# Patient Record
Sex: FEMALE
[De-identification: names, ages, dates, MRNs, and addresses within clinical notes are randomized per-mention and may not be internally consistent; named-entity substitution may affect disease eponyms.]

---

## 2018-07-17 PROBLEM — Z00.00 ENCOUNTER FOR PREVENTIVE HEALTH EXAMINATION: Status: ACTIVE | Noted: 2018-07-17

## 2019-01-28 ENCOUNTER — APPOINTMENT (OUTPATIENT)
Dept: PAIN MANAGEMENT | Facility: CLINIC | Age: 79
End: 2019-01-28
Payer: SELF-PAY

## 2019-01-28 DIAGNOSIS — G43.709 CHRONIC MIGRAINE W/OUT AURA, NOT INTRACTABLE, W/OUT STATUS MIGRAINOSUS: ICD-10-CM

## 2019-01-28 DIAGNOSIS — R51 HEADACHE: ICD-10-CM

## 2019-01-28 PROCEDURE — 99204 OFFICE O/P NEW MOD 45 MIN: CPT

## 2019-01-28 RX ORDER — GALCANEZUMAB 120 MG/ML
120 INJECTION, SOLUTION SUBCUTANEOUS
Qty: 2 | Refills: 0 | Status: ACTIVE | COMMUNITY
Start: 2019-01-28 | End: 1900-01-01

## 2019-01-28 RX ORDER — GALCANEZUMAB 120 MG/ML
120 INJECTION, SOLUTION SUBCUTANEOUS
Qty: 2 | Refills: 3 | Status: ACTIVE | COMMUNITY
Start: 2019-01-28 | End: 1900-01-01

## 2019-01-30 PROBLEM — G43.709 CHRONIC MIGRAINE: Status: ACTIVE | Noted: 2019-01-30

## 2019-01-30 PROBLEM — R51 CHRONIC INTRACTABLE HEADACHE, UNSPECIFIED HEADACHE TYPE: Status: ACTIVE | Noted: 2019-01-28

## 2019-01-30 NOTE — END OF VISIT
[] : Resident [FreeTextEntry3] : Pt with long history of chronic migraine with limited response to prevention over time.  Will plan for trial of CGRP response antibody

## 2019-01-30 NOTE — HISTORY OF PRESENT ILLNESS
[FreeTextEntry1] : Pt is a 77 y/o [place] F w/ PMHx chronic migraine, carpal tunnel, hysterectomy at age 51, diverticulitis requiring colon resection, cataracts, unprovoked DVT recently s/p anticoagulation x6 mo w/ positive antiphospholipid anticoagulant, presenting for an initial visit for chronic migraine since age 31.  Initially started like a tension feeling in her cheek, which she attributed to dental pain, but did not bother her. After this, does not recall the type of her typical headaches. For the past 20 years, headaches mostly occur in the morning, in the middle of her head, sometimes centers around one eye, prevents her from standing up, takes Naratriptan and then is able to stand up. \par Severity: 10/10, constant, dull, after one dose of Naratriptan goes down to 0/10.  Usually only requires one dose, but about 2-3x/mo has to take two doses. Takes about 14-16 triptans/mo. \par Frequency: irregular, some weeks up to 3x/wk, others less, in one month around 14 headache days/mo. 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 \par \par Imaging: no records available, has had MRI and CT in past, but more than 5 years ago. All reportedly normal. \par \par Fam History: remote history in aunt \par \par Prior Treatments: \par has tried supplements to lower histamine, diet restrictions to decrease histamine. \par Flumarizine 1 tab x3 mo\par Topiramate 50 mg QD x12 mo \par Propanolol 40 mg QD x10 mo \par Amitriptiline 75 mg QD x9 mo \par Depakote unknown dose \par Prednisone 40 mg x7 days, 20 mg x7 days, 10 mg x7 days \par Botox x 4 applications \par \par Current Meds: \par Naratriptan 20 mg \par Eletriptan (less frequently)\par  mg \par Ranitidine 150 mg qd \par Rosuvastatin 5 mg \par Timolol eye drops \par Melatonin prn \par Clonazepam 0.5 prn insomnia \par \par

## 2019-01-30 NOTE — REVIEW OF SYSTEMS
[As Noted in HPI] : as noted in HPI [Migraine Headache] : migraine headaches [Sleep Disturbances] : sleep disturbances [Negative] : Constitutional [Feeling Poorly] : feeling poorly [Feeling Tired] : feeling tired [Confused or Disoriented] : no confusion [Memory Lapses or Loss] : no memory loss [Decr. Concentrating Ability] : no decrease in concentrating ability [Difficulty with Language] : no ~M difficulty with language [Changed Thought Patterns] : no change in thought patterns [Repeating Questions] : no repeated questioning about recent events [Facial Weakness] : no facial weakness [Arm Weakness] : no arm weakness [Hand Weakness] : no hand weakness [Leg Weakness] : no leg weakness [Poor Coordination] : good coordination [Difficulty Writing] : no difficulty writing [Difficulties in Speech] : no speech difficulties [Numbness] : no numbness [Tingling] : no tingling [Abnormal Sensation] : no abnormal sensation [Hypersensitivity] : no hypersensitivity [Seizures] : no convulsions [Dizziness] : no dizziness [Lightheadedness] : no lightheadedness [Vertigo] : no vertigo [Cluster Headache] : no cluster headache [Tension Headache] : no tension-type headache [Difficulty Walking] : no difficulty walking [Inability to Walk] : able to walk [Ataxia] : no ataxia [Frequent Falls] : not falling [Limping] : not limping [Suicidal] : not suicidal [Anxiety] : no anxiety [Depression] : no depression [Palpitations] : no palpitations [Shortness Of Breath] : no shortness of breath

## 2019-01-30 NOTE — DISCUSSION/SUMMARY
[FreeTextEntry1] : 77 y/o F w/ chronic intractable migraine unresponsive to multiple treatment regimens. Discussed treatment options and adverse risks. She is interested in trying one of the newer CGRP antagonists, after explanations of benefits and possible adverse risks. \par \par Plan: \par - Emgality 120 mg/mL x2 for first dose at the office \par

## 2019-01-30 NOTE — PHYSICAL EXAM
[General Appearance - Alert] : alert [General Appearance - In No Acute Distress] : in no acute distress [Oriented To Time, Place, And Person] : oriented to person, place, and time [Impaired Insight] : insight and judgment were intact [Affect] : the affect was normal [Mood] : the mood was normal [Person] : oriented to person [Place] : oriented to place [Time] : oriented to time [Registration Intact] : recent registration memory intact [Visual Intact] : visual attention was ~T not ~L decreased [Repeating Phrases] : no difficulty repeating a phrase [Fluency] : fluency intact [Comprehension] : comprehension intact [Reading] : reading intact [Current Events] : adequate knowledge of current events [Past History] : adequate knowledge of personal past history [Vocabulary] : adequate range of vocabulary [Cranial Nerves Optic (II)] : visual acuity intact bilaterally,  visual fields full to confrontation, pupils equal round and reactive to light [Cranial Nerves Oculomotor (III)] : extraocular motion intact [Cranial Nerves Trigeminal (V)] : facial sensation intact symmetrically [Cranial Nerves Facial (VII)] : face symmetrical [Cranial Nerves Vestibulocochlear (VIII)] : hearing was intact bilaterally [Cranial Nerves Glossopharyngeal (IX)] : tongue and palate midline [Cranial Nerves Accessory (XI - Cranial And Spinal)] : head turning and shoulder shrug symmetric [Motor Strength] : muscle strength was normal in all four extremities [Sensation Tactile Decrease] : light touch was intact [Sensation Pain / Temperature Decrease] : pain and temperature was intact [Sclera] : the sclera and conjunctiva were normal [PERRL With Normal Accommodation] : pupils were equal in size, round, reactive to light, with normal accommodation [Optic Disc Abnormality] : the optic disc were normal in size and color [FreeTextEntry1] : G [General Appearance - Well Nourished] : well nourished [General Appearance - Well Developed] : well developed [General Appearance - Well-Appearing] : healthy appearing [No Muscle Atrophy] : normal bulk in all four extremities [Motor Handedness Right-Handed] : the patient is right hand dominant [Motor Strength Upper Extremities Bilaterally] : strength was normal in both upper extremities [Motor Strength Lower Extremities Bilaterally] : strength was normal in both lower extremities [Dysesthesia] : no dysesthesia [Neck Appearance] : the appearance of the neck was normal [Neck Cervical Mass (___cm)] : no neck mass was observed [Exaggerated Use Of Accessory Muscles For Inspiration] : no accessory muscle use [Edema] : there was no peripheral edema [Abnormal Walk] : normal gait [Nail Clubbing] : no clubbing  or cyanosis of the fingernails [Involuntary Movements] : no involuntary movements were seen [Musculoskeletal - Swelling] : no joint swelling seen [Motor Tone] : muscle strength and tone were normal [Skin Color & Pigmentation] : normal skin color and pigmentation [Skin Turgor] : normal skin turgor [] : no rash

## 2019-01-31 ENCOUNTER — APPOINTMENT (OUTPATIENT)
Dept: PAIN MANAGEMENT | Facility: CLINIC | Age: 79
End: 2019-01-31
Payer: SELF-PAY

## 2019-01-31 VITALS
WEIGHT: 130 LBS | HEART RATE: 60 BPM | SYSTOLIC BLOOD PRESSURE: 126 MMHG | DIASTOLIC BLOOD PRESSURE: 72 MMHG | BODY MASS INDEX: 22.2 KG/M2 | HEIGHT: 64 IN

## 2019-01-31 DIAGNOSIS — Z78.9 OTHER SPECIFIED HEALTH STATUS: ICD-10-CM

## 2019-01-31 PROCEDURE — 99213 OFFICE O/P EST LOW 20 MIN: CPT | Mod: 25

## 2019-01-31 PROCEDURE — 96372 THER/PROPH/DIAG INJ SC/IM: CPT

## 2019-01-31 RX ORDER — RIVAROXABAN 10 MG/1
10 TABLET, FILM COATED ORAL
Refills: 0 | Status: ACTIVE | COMMUNITY
Start: 2019-01-31

## 2019-01-31 RX ORDER — NARATRIPTAN 2.5 MG/1
2.5 TABLET, FILM COATED ORAL
Refills: 0 | Status: ACTIVE | COMMUNITY
Start: 2019-01-31

## 2019-02-01 PROBLEM — Z78.9 CURRENT NON-SMOKER: Status: ACTIVE | Noted: 2019-02-01

## 2019-02-01 NOTE — HISTORY OF PRESENT ILLNESS
[Headache] : headache [FreeTextEntry1] : Patient is here for loading dose of Emgality dose. \par Discussed proper injection technique, storage and disposal. \par Pt denies illness today.

## 2019-02-01 NOTE — ASSESSMENT
[FreeTextEntry1] : Next injection of Emgality 120mg in 30 days. \par Pt given instructions for patient portal.

## 2019-02-01 NOTE — PROCEDURE
[FreeTextEntry1] : Bilateral upper thighs cleaned with alcohol.\par Emgality 120mg given to each site. \par Pt tolerated injections well. Band Aids applied .\par Pt monitored in office for 15 minutes . \par \par